# Patient Record
Sex: MALE | Race: WHITE | NOT HISPANIC OR LATINO | Employment: FULL TIME | URBAN - METROPOLITAN AREA
[De-identification: names, ages, dates, MRNs, and addresses within clinical notes are randomized per-mention and may not be internally consistent; named-entity substitution may affect disease eponyms.]

---

## 2020-06-22 ENCOUNTER — OFFICE VISIT (OUTPATIENT)
Dept: AUDIOLOGY | Facility: CLINIC | Age: 30
End: 2020-06-22
Payer: COMMERCIAL

## 2020-06-22 DIAGNOSIS — H91.20 SUDDEN-ONSET SENSORINEURAL HEARING LOSS: Primary | ICD-10-CM

## 2020-06-22 DIAGNOSIS — H91.22 SUDDEN HEARING LOSS, LEFT: ICD-10-CM

## 2020-06-22 DIAGNOSIS — H90.42 SENSORINEURAL HEARING LOSS (SNHL) OF LEFT EAR WITH UNRESTRICTED HEARING OF RIGHT EAR: ICD-10-CM

## 2020-06-22 DIAGNOSIS — H91.22 SUDDEN HEARING LOSS, LEFT: Primary | ICD-10-CM

## 2020-06-22 NOTE — PROGRESS NOTES
AUDIOLOGY REPORT    SUMMARY: Audiology visit completed. See audiogram for results.      RECOMMENDATIONS: Follow-up with ENT.        Raman Velasco, CCC-A  Licensed Audiologist  [place] #[id number]

## 2020-06-23 LAB — B BURGDOR IGG+IGM SER QL: 0.06 (ref 0–0.89)

## 2020-06-24 ENCOUNTER — ANCILLARY PROCEDURE (OUTPATIENT)
Dept: MRI IMAGING | Facility: CLINIC | Age: 30
End: 2020-06-24
Attending: OTOLARYNGOLOGY
Payer: COMMERCIAL

## 2020-06-24 DIAGNOSIS — H91.22 SUDDEN HEARING LOSS, LEFT: ICD-10-CM

## 2020-06-24 RX ORDER — GADOBUTROL 604.72 MG/ML
7.5 INJECTION INTRAVENOUS ONCE
Status: COMPLETED | OUTPATIENT
Start: 2020-06-24 | End: 2020-06-24

## 2020-06-24 RX ADMIN — GADOBUTROL 7 ML: 604.72 INJECTION INTRAVENOUS at 07:54

## 2020-06-24 NOTE — DISCHARGE INSTRUCTIONS
MRI Contrast Discharge Instructions    The IV contrast you received today will pass out of your body in your  urine. This will happen in the next 24 hours. You will not feel this process.  Your urine will not change color.    Drink at least 4 extra glasses of water or juice today (unless your doctor  has restricted your fluids). This reduces the stress on your kidneys.  You may take your regular medicines.    If you are on dialysis: It is best to have dialysis today.    If you have a reaction: Most reactions happen right away. If you have  any new symptoms after leaving the hospital (such as hives or swelling),  call your hospital at the correct number below. Or call your family doctor.  If you have breathing distress or wheezing, call 911.    Special instructions: ***    I have read and understand the above information.    Signature:______________________________________ Date:___________    Staff:__________________________________________ Date:___________     Time:__________    [place] [facility] Departments:    ___[facility]: [phone]  ___[facility]: [phone]  ___[place]: [phone] ___[facility]: [phone]  ___[facility]: [phone]  ___[facility]: [phone]  ___[phone]  ___[facility]: [phone]  ___[phone] Contrast Discharge Instructions    The IV contrast you received today will pass out of your body in your  urine. This will happen in the next 24 hours. You will not feel this process.  Your urine will not change color.    Drink at least 4 extra glasses of water or juice today (unless your doctor  has restricted your fluids). This reduces the stress on your kidneys.  You may take your regular medicines.    If you are on dialysis: It is best to have dialysis today.    If you have a reaction: Most reactions happen right away. If you have  any new symptoms after leaving the hospital (such as hives or swelling),  call your hospital at the correct number  below. Or call your family doctor.  If you have breathing distress or wheezing, call 911.    Special instructions: ***    I have read and understand the above information.    Signature:______________________________________ Date:___________    Staff:__________________________________________ Date:___________     Time:__________    [place] Rad[facility] Departments:    ___[place]: [phone]  ___[place]: [phone]  ___[place]: [phone] ___[place]: [phone]  ___[facility]: [phone]  ___[facility]: [phone]  ___[phone]  ___[place] [facility]: [phone]  ___[phone]

## 2020-07-03 ENCOUNTER — OFFICE VISIT (OUTPATIENT)
Dept: OTOLARYNGOLOGY | Facility: CLINIC | Age: 30
End: 2020-07-03
Payer: COMMERCIAL

## 2020-07-03 ENCOUNTER — OFFICE VISIT (OUTPATIENT)
Dept: AUDIOLOGY | Facility: CLINIC | Age: 30
End: 2020-07-03
Payer: COMMERCIAL

## 2020-07-03 VITALS
HEART RATE: 62 BPM | HEIGHT: 70 IN | WEIGHT: 144 LBS | DIASTOLIC BLOOD PRESSURE: 73 MMHG | TEMPERATURE: 98.3 F | RESPIRATION RATE: 15 BRPM | OXYGEN SATURATION: 97 % | SYSTOLIC BLOOD PRESSURE: 132 MMHG | BODY MASS INDEX: 20.62 KG/M2

## 2020-07-03 DIAGNOSIS — H90.42 SENSORINEURAL HEARING LOSS (SNHL) OF LEFT EAR WITH UNRESTRICTED HEARING OF RIGHT EAR: Primary | ICD-10-CM

## 2020-07-03 DIAGNOSIS — H91.22 SUDDEN HEARING LOSS, LEFT: Primary | ICD-10-CM

## 2020-07-03 DIAGNOSIS — H93.12 TINNITUS OF LEFT EAR: ICD-10-CM

## 2020-07-03 RX ORDER — PREDNISONE 20 MG/1
TABLET ORAL
COMMUNITY
Start: 2020-06-20 | End: 2020-07-10

## 2020-07-03 RX ORDER — DOXYCYCLINE HYCLATE 100 MG
TABLET ORAL
COMMUNITY
Start: 2020-06-21 | End: 2020-07-10

## 2020-07-03 RX ORDER — LEVOTHYROXINE SODIUM 88 UG/1
TABLET ORAL
COMMUNITY
Start: 2020-06-20

## 2020-07-03 SDOH — HEALTH STABILITY: MENTAL HEALTH: HOW OFTEN DO YOU HAVE A DRINK CONTAINING ALCOHOL?: 2-4 TIMES A MONTH

## 2020-07-03 ASSESSMENT — PAIN SCALES - GENERAL: PAINLEVEL: NO PAIN (0)

## 2020-07-03 ASSESSMENT — MIFFLIN-ST. JEOR: SCORE: 1624.43

## 2020-07-03 NOTE — LETTER
Date:July 28, 2020      Patient was self referred, no letter generated. Do not send.        [facility] Health Information

## 2020-07-03 NOTE — PROGRESS NOTES
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     History of present illness: He completed a 2-week oral prednisone course, taking 60 mg daily.  He reports today that he continues to have bothersome left tinnitus.  The quality of tinnitus can be roaring, sometimes metallic, and sometimes tinny.  The tinnitus is always present but it is not preventing him from sleeping.  The steroids seem to have that effect.  He also continues to have sound distortion in the hearing in the left ear.  Further, he has experienced numbness in the triangular fossa on the involved side.      Physical examination:   /73   Pulse 62   Temp 98.3  F (36.8  C)   Resp 15   Ht 1.778 m (5' 10\")   Wt 65.3 kg (144 lb)   SpO2 97%   BMI 20.66 kg/m    He appeared well.  He has normal facial nerve function bilaterally.  Ear canals are lined with healthy skin and he has normal tympanic membranes with aerated middle ear spaces bilaterally.    Audiogram:  6/22, baseline above, today's audiogram below    T       Impression and plan:  Left sudden sensorineural hearing loss with partial improvement following 2-week steroid course.  We discussed salvage intratympanic therapy today, including the pros and cons and different strategies that have been employed for this.  We discussed the common serious risks in detail and he indicated that he would like to proceed.  Plan will be for weekly dexamethasone injections to the left ear followed by audiometry to assess for treatment response.    Procedure: Left ear was confirmed to be the correct ear.  The ear was visualized underneath the otologic microscope.  Posterior inferior aspect was anesthetized with topical phenol.  Needle was placed through the tympanic membrane, filling the middle ear space with dexamethasone 24 mg/mL solution.  He rested with his left ear upward for 20 minutes and then was discharged " from the clinic.  He tolerated the procedure well.    Megan Patel MD  Otology & Neurotology  [facility]

## 2020-07-03 NOTE — LETTER
"7/3/2020       RE: Tesfaye Morton  [address] [address]     Dear Colleague,    Thank you for referring your patient, Tesfaye Morton, to the [facility] EAR NOSE AND THROAT at [facility]. Please see a copy of my visit note below.    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     History of present illness: He completed a 2-week oral prednisone course, taking 60 mg daily.  He reports today that he continues to have bothersome left tinnitus.  The quality of tinnitus can be roaring, sometimes metallic, and sometimes tinny.  The tinnitus is always present but it is not preventing him from sleeping.  The steroids seem to have that effect.  He also continues to have sound distortion in the hearing in the left ear.  Further, he has experienced numbness in the triangular fossa on the involved side.      Physical examination:   /73   Pulse 62   Temp 98.3  F (36.8  C)   Resp 15   Ht 1.778 m (5' 10\")   Wt 65.3 kg (144 lb)   SpO2 97%   BMI 20.66 kg/m    He appeared well.  He has normal facial nerve function bilaterally.  Ear canals are lined with healthy skin and he has normal tympanic membranes with aerated middle ear spaces bilaterally.    Audiogram:  6/22, baseline above, today's audiogram below    T       Impression and plan:  Left sudden sensorineural hearing loss with partial improvement following 2-week steroid course.  We discussed salvage intratympanic therapy today, including the pros and cons and different strategies that have been employed for this.  We discussed the common serious risks in detail and he indicated that he would like to proceed.  Plan will be for weekly dexamethasone injections to the left ear followed by audiometry to assess for treatment response.    Procedure: Left ear was confirmed to be the correct ear.  The ear was visualized " underneath the otologic microscope.  Posterior inferior aspect was anesthetized with topical phenol.  Needle was placed through the tympanic membrane, filling the middle ear space with dexamethasone 24 mg/mL solution.  He rested with his left ear upward for 20 minutes and then was discharged from the clinic.  He tolerated the procedure well.    Megan Patel MD  Otology & Neurotology  [facility]        Again, thank you for allowing me to participate in the care of your patient.      Sincerely,    Megan Patel MD

## 2020-07-03 NOTE — PATIENT INSTRUCTIONS
1. You were seen in the ENT Clinic today by .  If you have any questions or concerns after your appointment, please call   - Option 1: ENT Clinic: [phone]  - Option 2: Steph (' Nurse): [phone]    2.   Plan to return to clinic on Tuesday or Friday of next week.     CYNTHIA Choi  Care Coordinator  [facility] Otolaryngology  [phone]

## 2020-07-03 NOTE — PROGRESS NOTES
AUDIOLOGY REPORT    SUMMARY: Audiology visit completed. See audiogram for results.      RECOMMENDATIONS: Follow-up with ENT.      Mago Hwang  Audiologist  [place] License  #[id number]

## 2020-07-10 ENCOUNTER — OFFICE VISIT (OUTPATIENT)
Dept: OTOLARYNGOLOGY | Facility: CLINIC | Age: 30
End: 2020-07-10
Payer: COMMERCIAL

## 2020-07-10 VITALS
TEMPERATURE: 98.1 F | WEIGHT: 144 LBS | SYSTOLIC BLOOD PRESSURE: 113 MMHG | HEIGHT: 70 IN | DIASTOLIC BLOOD PRESSURE: 77 MMHG | BODY MASS INDEX: 20.62 KG/M2 | HEART RATE: 66 BPM

## 2020-07-10 DIAGNOSIS — H91.22 SUDDEN HEARING LOSS, LEFT: Primary | ICD-10-CM

## 2020-07-10 ASSESSMENT — MIFFLIN-ST. JEOR: SCORE: 1624.43

## 2020-07-10 ASSESSMENT — PAIN SCALES - GENERAL: PAINLEVEL: NO PAIN (0)

## 2020-07-10 NOTE — NURSING NOTE
"Chief Complaint   Patient presents with     Procedure     dexamethasone injection     Blood pressure 113/77, pulse 66, temperature 98.1  F (36.7  C), height 1.778 m (5' 10\"), weight 65.3 kg (144 lb).    Marcio Mohan LPN    "

## 2020-07-10 NOTE — PROGRESS NOTES
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    Hearing improved over the weekend after the first injection, but then declined again to the subjective pre-treatment baseline thereafter.  Tinnitus is now less bothersome.    Procedure:  Left ear was visualized with the otomicroscope. Needle inserted posteriorly at prior site where the phenol had been placed. Dexamethasone 24 mg/ml solution was instilled, filling the middle ear space.  He rested with the left ear up for 20 minutes and was discharged in stable condition.    Megan Patel MD  Otology & Neurotology  [facility]

## 2020-07-10 NOTE — LETTER
Date:July 14, 2020      Patient was self referred, no letter generated. Do not send.        [facility] Health Information

## 2020-07-10 NOTE — PATIENT INSTRUCTIONS
1. You were seen in the ENT Clinic today by  .  If you have any questions or concerns after your appointment, please call   - Option 1: ENT Clinic: [phone]   - Option 2: Steph ('  Nurse): [phone]     2.   Plan to return to clinic for audio and injection afterwards.    Leola Lepe LPN  [facility] - Otolaryngology

## 2020-07-10 NOTE — LETTER
7/10/2020       RE: Tesfaye Morton  [address] [address]     Dear Colleague,    Thank you for referring your patient, Tesfaye Morton, to the [facility] EAR NOSE AND THROAT at [facility]. Please see a copy of my visit note below.    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    Hearing improved over the weekend after the first injection, but then declined again to the subjective pre-treatment baseline thereafter.  Tinnitus is now less bothersome.    Procedure:  Left ear was visualized with the otomicroscope. Needle inserted posteriorly at prior site where the phenol had been placed. Dexamethasone 24 mg/ml solution was instilled, filling the middle ear space.  He rested with the left ear up for 20 minutes and was discharged in stable condition.    Megan Patel MD  Otology & Neurotology  [facility]        Again, thank you for allowing me to participate in the care of your patient.      Sincerely,    Megan Patel MD

## 2020-07-14 ENCOUNTER — OFFICE VISIT (OUTPATIENT)
Dept: AUDIOLOGY | Facility: CLINIC | Age: 30
End: 2020-07-14
Payer: COMMERCIAL

## 2020-07-14 ENCOUNTER — OFFICE VISIT (OUTPATIENT)
Dept: OTOLARYNGOLOGY | Facility: CLINIC | Age: 30
End: 2020-07-14
Payer: COMMERCIAL

## 2020-07-14 VITALS — HEART RATE: 63 BPM | BODY MASS INDEX: 20.95 KG/M2 | OXYGEN SATURATION: 98 % | WEIGHT: 146 LBS | TEMPERATURE: 96.6 F

## 2020-07-14 DIAGNOSIS — H91.22 SUDDEN HEARING LOSS, LEFT: Primary | ICD-10-CM

## 2020-07-14 DIAGNOSIS — H90.42 SENSORINEURAL HEARING LOSS (SNHL) OF LEFT EAR WITH UNRESTRICTED HEARING OF RIGHT EAR: Primary | ICD-10-CM

## 2020-07-14 ASSESSMENT — PAIN SCALES - GENERAL: PAINLEVEL: NO PAIN (0)

## 2020-07-14 NOTE — LETTER
Date:July 16, 2020      Patient was self referred, no letter generated. Do not send.        [facility] Health Information

## 2020-07-14 NOTE — PATIENT INSTRUCTIONS
1. You were seen in the ENT Clinic today by .  If you have any questions or concerns after your appointment, please call   - Option 1: ENT Clinic: [phone]  - Option 2: Steph (' Nurse): [phone]    2.   Plan to return to clinic in one week for a new audiogram.    CYNTHIA Choi  Care Coordinator  [facility] Otolaryngology  [phone]

## 2020-07-14 NOTE — NURSING NOTE
Chief Complaint   Patient presents with     RECHECK     Dex injection         Pulse 63, temperature 96.6  F (35.9  C), temperature source Temporal, weight 66.2 kg (146 lb), SpO2 98 %.    Roslyn Sage EMT

## 2020-07-14 NOTE — PROGRESS NOTES
Tesfaye Morton is seen in follow up. He is a 30 year old male with recent SSNHL affecting the high frequencies. He completed a course of high dose oral steroids with minimal improvement in hearing. He underwent IT dexamethasone injections on 7/3/2020 and 7/10/2020. He perceived subjective benefit in hearing and tinnitus following the first injection, however his hearing subsequently decreased. He reports minimal benefit following the second injection. His repeat audio today demonstrates stable hearing.     ROS: 10 point ROS neg other than the symptoms noted above in the HPI.    Physical examination:  male in no acute distress.  Alert and answering questions appropriately.  HB 1/6 bilaterally.  Left ear examined under the microscope. Ear canal patent, dry, TM intact with small area of crusting in the posterior quadrant at the site of previous steroid injection, middle ear well aerated.    Audiogram: 7/14/2020   Right ear: Normal hearing sensitivity.   Left ear: Normal hearing sensitivity to 4kHz downsloping to moderate-severe SNHL at 6kHz and 8kHz.   SRT right: 5 dB left: 0 dB   WR right: 100% at 45 dB left: 100% at 45 dB   Acoustic Reflexes: Present in all conditions.  Tympanograms: type A right, type A left     Audiogram from 7/3/2020 reviewed. Pure tone thresholds and WRS stable, type A tympanograms bilaterally.    Procedure    Procedure:  Left ear was visualized with the otomicroscope. Needle inserted posteriorly at prior site where the phenol had been placed. Dexamethasone 24 mg/ml solution was instilled, filling the middle ear space.  He rested with the left ear up for 20 minutes and was discharged in stable condition.    Assessment and plan:  Tesfaye Morton is a 30 year old male with left SSNHL. We performed his third IT dexamethasone injection in clinic today. We will see him back in 1 week with audio or sooner with problems.    Jesus Peoples MD  Fellow, Neurotology    I, Megan Patel MD, discussed this  patient with the resident/fellow at the time of consultation. I have reviewed the note, labs, imaging and am in agreement with the assessment and plan. I did not see the patient.

## 2020-07-14 NOTE — LETTER
7/14/2020       RE: Tesfaye Morton  [address] [address]     Dear Colleague,    Thank you for referring your patient, Tesfaye Morton, to the [facility] EAR NOSE AND THROAT at [facility]. Please see a copy of my visit note below.    Tesfaye Morton is seen in follow up. He is a 30 year old male with recent SSNHL affecting the high frequencies. He completed a course of high dose oral steroids with minimal improvement in hearing. He underwent IT dexamethasone injections on 7/3/2020 and 7/10/2020. He perceived subjective benefit in hearing and tinnitus following the first injection, however his hearing subsequently decreased. He reports minimal benefit following the second injection. His repeat audio today demonstrates stable hearing.     ROS: 10 point ROS neg other than the symptoms noted above in the HPI.    Physical examination:  male in no acute distress.  Alert and answering questions appropriately.  HB 1/6 bilaterally.  Left ear examined under the microscope. Ear canal patent, dry, TM intact with small area of crusting in the posterior quadrant at the site of previous steroid injection, middle ear well aerated.    Audiogram: 7/14/2020   Right ear: Normal hearing sensitivity.   Left ear: Normal hearing sensitivity to 4kHz downsloping to moderate-severe SNHL at 6kHz and 8kHz.   SRT right: 5 dB left: 0 dB   WR right: 100% at 45 dB left: 100% at 45 dB   Acoustic Reflexes: Present in all conditions.  Tympanograms: type A right, type A left     Audiogram from 7/3/2020 reviewed. Pure tone thresholds and WRS stable, type A tympanograms bilaterally.    Procedure    Procedure:  Left ear was visualized with the otomicroscope. Needle inserted posteriorly at prior site where the phenol had been placed. Dexamethasone 24 mg/ml solution was instilled, filling the middle ear space.  He rested with the left ear up for 20 minutes and was discharged in stable condition.    Assessment  and plan:  Tesfaye Morton is a 30 year old male with left SSNHL. We performed his third IT dexamethasone injection in clinic today. We will see him back in 1 week with audio or sooner with problems.    Jesus Peoples MD  Fellow, Neurotology    I, Megan Patel MD, discussed this patient with the resident/fellow at the time of consultation. I have reviewed the note, labs, imaging and am in agreement with the assessment and plan. I did not see the patient.      Again, thank you for allowing me to participate in the care of your patient.      Sincerely,    Megan Patel MD

## 2020-07-14 NOTE — PROGRESS NOTES
AUDIOLOGY REPORT    SUMMARY: Audiology visit completed. See audiogram for results.      RECOMMENDATIONS: Follow-up with ENT.      Raman Camarena, CCC-A  Licensed Audiologist  [place] #[id number]

## 2020-07-21 ENCOUNTER — OFFICE VISIT (OUTPATIENT)
Dept: AUDIOLOGY | Facility: CLINIC | Age: 30
End: 2020-07-21
Payer: COMMERCIAL

## 2020-07-21 DIAGNOSIS — H90.42 SENSORINEURAL HEARING LOSS (SNHL) OF LEFT EAR WITH UNRESTRICTED HEARING OF RIGHT EAR: Primary | ICD-10-CM

## 2020-07-21 NOTE — PROGRESS NOTES
AUDIOLOGY REPORT    SUMMARY: Audiology visit completed. See audiogram for results.      RECOMMENDATIONS: Follow-up with ENT.      Raman Camarena, CCC-A  Licensed Audiologist  [place] #[id number]

## 2020-12-27 ENCOUNTER — HEALTH MAINTENANCE LETTER (OUTPATIENT)
Age: 30
End: 2020-12-27

## 2021-10-09 ENCOUNTER — HEALTH MAINTENANCE LETTER (OUTPATIENT)
Age: 31
End: 2021-10-09

## 2022-01-29 ENCOUNTER — HEALTH MAINTENANCE LETTER (OUTPATIENT)
Age: 32
End: 2022-01-29

## 2022-09-17 ENCOUNTER — HEALTH MAINTENANCE LETTER (OUTPATIENT)
Age: 32
End: 2022-09-17

## 2023-05-06 ENCOUNTER — HEALTH MAINTENANCE LETTER (OUTPATIENT)
Age: 33
End: 2023-05-06

## 2024-09-21 ENCOUNTER — HEALTH MAINTENANCE LETTER (OUTPATIENT)
Age: 34
End: 2024-09-21